# Patient Record
Sex: MALE
[De-identification: names, ages, dates, MRNs, and addresses within clinical notes are randomized per-mention and may not be internally consistent; named-entity substitution may affect disease eponyms.]

---

## 2020-06-29 ENCOUNTER — HOSPITAL ENCOUNTER (INPATIENT)
Dept: HOSPITAL 92 - ERS | Age: 60
LOS: 2 days | Discharge: HOME | DRG: 565 | End: 2020-07-01
Attending: INTERNAL MEDICINE | Admitting: INTERNAL MEDICINE
Payer: OTHER GOVERNMENT

## 2020-06-29 VITALS — BODY MASS INDEX: 27.9 KG/M2

## 2020-06-29 DIAGNOSIS — Z89.511: ICD-10-CM

## 2020-06-29 DIAGNOSIS — E87.1: ICD-10-CM

## 2020-06-29 DIAGNOSIS — T87.43: Primary | ICD-10-CM

## 2020-06-29 DIAGNOSIS — L23.89: ICD-10-CM

## 2020-06-29 DIAGNOSIS — F10.20: ICD-10-CM
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
## 2020-06-29 DIAGNOSIS — D75.89: ICD-10-CM

## 2020-06-29 DIAGNOSIS — Z87.891: ICD-10-CM

## 2020-06-29 DIAGNOSIS — E03.9: ICD-10-CM

## 2020-06-29 DIAGNOSIS — E87.2: ICD-10-CM

## 2020-06-29 DIAGNOSIS — Z79.899: ICD-10-CM

## 2020-06-29 LAB
ALBUMIN SERPL BCG-MCNC: 3.5 G/DL (ref 3.5–5)
ALP SERPL-CCNC: 237 U/L (ref 40–110)
ALT SERPL W P-5'-P-CCNC: 60 U/L (ref 8–55)
ANION GAP SERPL CALC-SCNC: 16 MMOL/L (ref 10–20)
AST SERPL-CCNC: 75 U/L (ref 5–34)
BASOPHILS # BLD AUTO: 0.1 THOU/UL (ref 0–0.2)
BASOPHILS NFR BLD AUTO: 0.8 % (ref 0–1)
BILIRUB SERPL-MCNC: 0.7 MG/DL (ref 0.2–1.2)
BUN SERPL-MCNC: 5 MG/DL (ref 8.4–25.7)
CALCIUM SERPL-MCNC: 8.9 MG/DL (ref 7.8–10.44)
CHLORIDE SERPL-SCNC: 96 MMOL/L (ref 98–107)
CO2 SERPL-SCNC: 19 MMOL/L (ref 22–29)
CREAT CL PREDICTED SERPL C-G-VRATE: 0 ML/MIN (ref 70–130)
EOSINOPHIL # BLD AUTO: 0.1 THOU/UL (ref 0–0.7)
EOSINOPHIL NFR BLD AUTO: 1.4 % (ref 0–10)
GLOBULIN SER CALC-MCNC: 4.4 G/DL (ref 2.4–3.5)
GLUCOSE SERPL-MCNC: 111 MG/DL (ref 70–105)
HGB BLD-MCNC: 14.2 G/DL (ref 14–18)
LYMPHOCYTES # BLD: 1.7 THOU/UL (ref 1.2–3.4)
LYMPHOCYTES NFR BLD AUTO: 25.4 % (ref 21–51)
MCH RBC QN AUTO: 35.6 PG (ref 27–31)
MCV RBC AUTO: 103 FL (ref 78–98)
MDIFF COMPLETE?: YES
MONOCYTES # BLD AUTO: 0.8 THOU/UL (ref 0.11–0.59)
MONOCYTES NFR BLD AUTO: 12.3 % (ref 0–10)
NEUTROPHILS # BLD AUTO: 4.1 THOU/UL (ref 1.4–6.5)
NEUTROPHILS NFR BLD AUTO: 60.1 % (ref 42–75)
PLATELET # BLD AUTO: 109 THOU/UL (ref 130–400)
POTASSIUM SERPL-SCNC: 3.7 MMOL/L (ref 3.5–5.1)
RBC # BLD AUTO: 3.98 MILL/UL (ref 4.7–6.1)
SODIUM SERPL-SCNC: 127 MMOL/L (ref 136–145)
WBC # BLD AUTO: 6.9 THOU/UL (ref 4.8–10.8)

## 2020-06-29 PROCEDURE — 87070 CULTURE OTHR SPECIMN AEROBIC: CPT

## 2020-06-29 PROCEDURE — 96366 THER/PROPH/DIAG IV INF ADDON: CPT

## 2020-06-29 PROCEDURE — 87205 SMEAR GRAM STAIN: CPT

## 2020-06-29 PROCEDURE — 84443 ASSAY THYROID STIM HORMONE: CPT

## 2020-06-29 PROCEDURE — 80053 COMPREHEN METABOLIC PANEL: CPT

## 2020-06-29 PROCEDURE — 96365 THER/PROPH/DIAG IV INF INIT: CPT

## 2020-06-29 PROCEDURE — 80202 ASSAY OF VANCOMYCIN: CPT

## 2020-06-29 PROCEDURE — 96367 TX/PROPH/DG ADDL SEQ IV INF: CPT

## 2020-06-29 PROCEDURE — 87040 BLOOD CULTURE FOR BACTERIA: CPT

## 2020-06-29 PROCEDURE — 36415 COLL VENOUS BLD VENIPUNCTURE: CPT

## 2020-06-29 PROCEDURE — 85025 COMPLETE CBC W/AUTO DIFF WBC: CPT

## 2020-06-29 PROCEDURE — 83605 ASSAY OF LACTIC ACID: CPT

## 2020-06-29 NOTE — CT
CT Lower Ext Rt W Con



History: Erythema, ecchymosis of right distal thigh



Comparison: None.



Findings: The exam does not go through the right below the knee amputation stump. Possible inflow art
ifact versus less likely thrombosis of the right femoral and popliteal veins.



Mild superficial soft tissue swelling along the thigh. No abnormal fascial swelling. No evidence for 
myositis. Musculature below the knee is atrophied.



Mild demineralization. No acute fracture.



Approximately 50% narrowing of the right popliteal artery at the level of the knee for length of 4 mm
. No definite arterial occlusion.



Small reactive right superficial inguinal lymph nodes.



Impression: 

1. Mild right lower extremity superficial soft tissue swelling can be seen with cellulitis.

2. Likely inflow artifact and less likely venous thrombosis of the right distal femoral and popliteal
 vein. Ultrasound recommended if clinically warranted. This also could be a source of lower

extremity edema if this is indeed a thrombus.



Reported By: Bernardo Peres 

Electronically Signed:  6/29/2020 7:21 PM

## 2020-06-30 NOTE — PDOC.HHP
Hospitalist HPI





- History of Present Illness


leg swelling


History of Present Illness: 


Case of an 59y/o male with pmhx of alcoholism and bka due to an infection who 

comes to hospital due to swelling and erythema of stump. patient refers that he 

was on his usual state of health until 3-4 days ago when he noted some blister 

after changing his stump sock. he states those blister kept progressing and now 

cover all of his thigh. he refers erythema swelling and tenderness to 

palpation. patient denies any fever chills or general malaise, he does report 

some purulent secretions





Hospitalist ROS





- Review of Systems


All other systems reviewed; all pertinent +/- noted in HPI/Subj





- Medication


Medications: 


Active Medications











Generic Name Dose Route Start Last Admin





  Trade Name Freq  PRN Reason Stop Dose Admin


 


Piperacillin Sod/Tazobactam  100 mls @ 200 mls/hr  06/29/20 23:59  06/30/20 00:

56





  Sod 3.375 gm/ Sodium Chloride  IVPB   100 mls





  Q6HR TAMARA   Administration





     





     





     





     














Hospitalist History





- Past Medical History


Source: patient





- Past Surgical History


Other Surgical History: 





bka





- Family History


Family History: reports: cancer, cardiac disorder





- Social History


Smoking Status: Former smoker


Alcohol: reports: Heavy


Drugs: reports: none


Activity level: independent ambulation





- Exam


General Appearance: NAD, awake alert


Eye: PERRL, anicteric sclera


ENT: normocephalic atraumatic, no oropharyngeal lesions


Neck: supple, symmetric, no JVD, no thyromegaly


Heart: RRR, no murmur, no gallops


Respiratory: CTAB, no wheezes, no rales, no ronchi


Gastrointestinal: soft, non-tender, non-distended, normal bowel sounds


Extremities: no cyanosis, no clubbing, no edema


Extremities - other findings: erythema swelling purulent secreiont


Neurological: cranial nerve grossly intact, normal sensation to touch, no 

weakness


Musculoskeletal: normal tone, normal strength, no muscle wasting


Psychiatric: normal affect, normal behavior, A&O x 3





Hospitalist Results





- Labs


Result Diagrams: 


 06/29/20 18:03





 06/29/20 18:03


Lab results: 


 











WBC  6.9 thou/uL (4.8-10.8)   06/29/20  18:03    


 


Hgb  14.2 g/dL (14.0-18.0)   06/29/20  18:03    


 


Hct  40.9 % (42.0-52.0)  L  06/29/20  18:03    


 


MCV  103.0 fL (78.0-98.0)  H  06/29/20  18:03    


 


Plt Count  109 thou/uL (130-400)  L  06/29/20  18:03    


 


Neutrophils %  60.1 % (42.0-75.0)   06/29/20  18:03    


 


Sodium  127 mmol/L (136-145)  L  06/29/20  18:03    


 


Potassium  3.7 mmol/L (3.5-5.1)   06/29/20  18:03    


 


Chloride  96 mmol/L ()  L  06/29/20  18:03    


 


Carbon Dioxide  19 mmol/L (22-29)  L  06/29/20  18:03    


 


BUN  5 mg/dL (8.4-25.7)  L  06/29/20  18:03    


 


Creatinine  0.89 mg/dL (0.7-1.3)   06/29/20  18:03    


 


Glucose  111 mg/dL ()  H  06/29/20  18:03    


 


Lactic Acid  2.6 mmol/L (0.5-2.2)  H  06/29/20  21:01    


 


Calcium  8.9 mg/dL (7.8-10.44)   06/29/20  18:03    


 


Total Bilirubin  0.7 mg/dL (0.2-1.2)   06/29/20  18:03    


 


AST  75 U/L (5-34)  H  06/29/20  18:03    


 


ALT  60 U/L (8-55)  H  06/29/20  18:03    


 


Alkaline Phosphatase  237 U/L ()  H  06/29/20  18:03    


 


Serum Total Protein  7.9 g/dL (6.0-8.3)   06/29/20  18:03    


 


Albumin  3.5 g/dL (3.5-5.0)   06/29/20  18:03    














- EKG Interpretation


EKG: 


leg ct - soft tissues swelling





Hospitalist H&P A/P





- Problem


(1) Cellulitis


Code(s): L03.90 - CELLULITIS, UNSPECIFIED   Status: Acute   





(2) Alcoholism


Code(s): F10.20 - ALCOHOL DEPENDENCE, UNCOMPLICATED   Status: Acute   





(3) Hyponatremia


Code(s): E87.1 - HYPO-OSMOLALITY AND HYPONATREMIA   Status: Acute   





- Plan


Plan: 


59 y/o male w the states pmhx who comes to hospital due to cellulits of his leg 

with elevated lactid acid





- zosyn + vanc


- f/u blood cultures


- ivfs, f/u bmps


- f/u lactid acid


- f/u us venous r/o dvt


- thiamine / folic acid


- ativan prn for withdrawal

## 2020-06-30 NOTE — CON
DATE OF CONSULTATION:  06/30/2020



REASON FOR CONSULTATION:  Possible cellulitis versus dermatitis.



HISTORY OF PRESENT ILLNESS:  A 60-year-old who has a history of alcoholism and

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 
smoking

in the past, [place] himself and 
developed a

burning sensation in the right thigh about 2 or 3 days before admission.  This 
was

associated with a new sock for his prostatic device.  He ended up having to 
come to

the hospital, because of persistence of the abnormality with pain.  He did not 
have

any fever, chills.  No vomiting.  No cough.  No respiratory symptoms.  No 
diarrhea.

No genitourinary symptoms. 



PAST MEDICAL HISTORY:  Necrotizing infection, right foot, which led to a right 
BKA,

alcoholism. 



FAMILY HISTORY:  Coronary artery disease.



SOCIAL HISTORY:  Lives by himself in a trailer park, is disabled.  Former 
smoker.

Drinks heavily daily.  No other drug use. 



CURRENT MEDICATIONS:  

1. Norco.

2. Lovenox.

3. Folvite.

4. Ativan.

5. Zofran.

6. Zosyn.

7. Thiamine.

8. Vancomycin.



PHYSICAL EXAMINATION:

VITAL SIGNS:  Temperature max 99.1, /72, pulse 98, respirations 16, and O2

saturation 95. 

SKIN:  Shows the left leg with hyperkeratotic scabs in the legs in a 
circumferential

distribution.  The thigh has about 8 to 9 cm white band wrapping around the mid

right thigh with areas of blistering, purpura and erythema.  When I went to see 
the

patient, the blistering had kind of subsided a bit.  Most of it is 
characterized by

violaceous plaques with kind of a dried appearance.  Now it is more of a 
pruritic

sensation than pain or burning sensation that had been present before.  The end 
of

the stump itself appears okay without any changes of significance.  He has no

lymphadenopathy.  He is disheveled. 

HEENT:  Ocular movements conjugate.  Conjunctivae normal.  Oral cavity with 
quite a

few decaying teeth in place. 

NECK:  Supple, jugular vein distention. 

LUNGS:  Symmetric clear breath sounds. 

HEART:  S1, S2 regular rate.  No S3 or S4. 

ABDOMEN:  Soft, not distended or nontender.  No ascites.  No bladder 
distention.   

GENITOURINARY:  No genital abnormalities. 

PULSES:  1+ in popliteals.   

I could not feel dorsalis pedis on the left side.   

NEUROLOGIC:  He is awake, kind of somewhat cantankerous, not very willing to be

examined or interviewed. 



LABORATORY DATA:  His white cell count is 6.9, hemoglobin 14, platelets 109, 60%

neutrophils, 25% lymphocytes.  He had a sodium of 127, creatinine 0.89, AST 75, 
ALT

60, alkaline phosphatase 237, albumin 3.5, globulin 4.4, lactic acid was 2.4 
down to

1.5.  Microbiology has 2 sets of negative blood cultures and there was a 
drainage

wound with a young culture.  There is a moderate gram-positive cocci in pairs 
and

clusters, moderate gram-positive rods, looks like skin allyssa type of recovery.

There is a vascular ultrasound, which showed no DVT in the right lower 
extremity and

there is a lower extremity CT scan which showed mild right lower extremity

superficial soft tissue swelling and nothing else. 



ASSESSMENT:  

1. Prior below-the-knee amputation related to an infection in the right foot in 
the

past, alcoholism with possible associated malnutrition, multivitamin deficiency,

particularly thiamine needs to be considered and replaced. 

2. Bandlike area of erythema with blistering, now with pruritic sensation.  The 
area

is sharply marginated along the material that had been placed, which is the sock

used for the patient to fit the prostatic device in his right lower extremity 
stump. 



DISCUSSION:  The differential diagnosis includes a proshesis sock associated

dermatitis, which is the more likely scenario versus cellulitis.  The sharpness 
of

demarcation along the span of the sock is very suggestive of a form of contact

dermatitis rather than cellulitis and the speed with which this has improved 
also

supports this assessment.  It is unlikely that bacterial pathogens would limit 
their

spread to a very sharp demarcated line along the previously placed material, so 
I

would at this point, recommend discontinuation of antimicrobial therapy, at the 
most

maybe oral Keflex or something of that nature for discharge planning for short

period of time.  He certainly will have to change the material of the sock that 
he

uses to apply the prosthesis  to a different one to prevent recurrence,









Job ID:  [id number]



[facility]

## 2020-06-30 NOTE — ULT
RIGHT LOWER EXTREMITY VENOUS ULTRASOUND:

 

HISTORY: 

Right lower extremity redness and pain.  History of amputation.

 

TECHNIQUE: 

Multiplanar, gray scale, and color Doppler images were obtained in a right lower extremity venous ult
rasound.  Spectral analysis of the Doppler waveforms.

 

FINDINGS: 

The right common femoral vein, profunda femoral vein, superficial femoral vein, and popliteal vein ar
e normal in appearance without visible thrombus.  These vessels demonstrate normal compression, flow,
 and augmentation.  The greater saphenous vein is patent.  A mildly prominent lymph node is seen in t
he right inguinal region measuring 1.9 cm in size.

 

IMPRESSION: 

No evidence of deep vein thrombosis.

 

POS: MICHELLEA

## 2020-06-30 NOTE — PDOC.EVN
Event Note





- Event Note


Event Note: 





Patient is doing okay.  Records reviewed.  Patient was admitted the last night.

  He developed some redness around his right thigh yesterday.  Patient has a 

below the knee amputation has had prior infections in the lower extremity.  He 

does wear a prosthesis.  He denies any new material associated with the 

prosthesis or the sleeve.  His exam reveals a very well circumscribed band of 

erythema around the entire thigh about 5 inches in width.  There appears to be 

some dermal lysis superficially.  There are small satellite erythematous 

macules extending distally from this area.  There is some sloughing of 

superficial skin both at the proximal and distal borders of the band.  His 

white count is normal.  He has been afebrile.  Concerned that this is actually 

more of a contact dermatitis than a true infection.  Patient does not believe 

that to be the case at all.  He is currently on vancomycin and Zosyn.  He 

reports that he [facility] when [facility] had an infected 

right lower extremity.  He reported it gave him diarrhea for the entire time he 

was on it.  He is reporting some of that again now.  I will asked Dr. Holt to 

assess the patient as well.  Patient also has a significant history of alcohol 

abuse.  He has associated hyponatremia which is likely beer Poto oumar.  He is 

on B vitamins.  We will recheck labs.

## 2020-07-01 VITALS — DIASTOLIC BLOOD PRESSURE: 75 MMHG | TEMPERATURE: 98.6 F | SYSTOLIC BLOOD PRESSURE: 118 MMHG

## 2020-07-01 LAB
ALBUMIN SERPL BCG-MCNC: 3.2 G/DL (ref 3.5–5)
ALP SERPL-CCNC: 204 U/L (ref 40–110)
ALT SERPL W P-5'-P-CCNC: 58 U/L (ref 8–55)
ANION GAP SERPL CALC-SCNC: 13 MMOL/L (ref 10–20)
AST SERPL-CCNC: 76 U/L (ref 5–34)
BASOPHILS # BLD AUTO: 0 THOU/UL (ref 0–0.2)
BASOPHILS NFR BLD AUTO: 0.3 % (ref 0–1)
BILIRUB SERPL-MCNC: 1.1 MG/DL (ref 0.2–1.2)
BUN SERPL-MCNC: 7 MG/DL (ref 8.4–25.7)
CALCIUM SERPL-MCNC: 8.3 MG/DL (ref 7.8–10.44)
CHLORIDE SERPL-SCNC: 103 MMOL/L (ref 98–107)
CO2 SERPL-SCNC: 21 MMOL/L (ref 22–29)
CREAT CL PREDICTED SERPL C-G-VRATE: 80 ML/MIN (ref 70–130)
EOSINOPHIL # BLD AUTO: 0 THOU/UL (ref 0–0.7)
EOSINOPHIL NFR BLD AUTO: 0.7 % (ref 0–10)
GLOBULIN SER CALC-MCNC: 4.2 G/DL (ref 2.4–3.5)
GLUCOSE SERPL-MCNC: 130 MG/DL (ref 70–105)
HGB BLD-MCNC: 13.9 G/DL (ref 14–18)
LYMPHOCYTES # BLD: 0.9 THOU/UL (ref 1.2–3.4)
LYMPHOCYTES NFR BLD AUTO: 13.9 % (ref 21–51)
MCH RBC QN AUTO: 34.8 PG (ref 27–31)
MCV RBC AUTO: 105 FL (ref 78–98)
MONOCYTES # BLD AUTO: 0.9 THOU/UL (ref 0.11–0.59)
MONOCYTES NFR BLD AUTO: 14.4 % (ref 0–10)
NEUTROPHILS # BLD AUTO: 4.4 THOU/UL (ref 1.4–6.5)
NEUTROPHILS NFR BLD AUTO: 70.7 % (ref 42–75)
PLATELET # BLD AUTO: 95 THOU/UL (ref 130–400)
POTASSIUM SERPL-SCNC: 3.6 MMOL/L (ref 3.5–5.1)
RBC # BLD AUTO: 3.99 MILL/UL (ref 4.7–6.1)
SODIUM SERPL-SCNC: 133 MMOL/L (ref 136–145)
VANCOMYCIN TROUGH SERPL-MCNC: 31.3 UG/ML
WBC # BLD AUTO: 6.3 THOU/UL (ref 4.8–10.8)

## 2020-07-02 NOTE — DIS
DATE OF ADMISSION:  06/29/2020



DATE OF DISCHARGE:  07/01/2020



DISCHARGE DIAGNOSES:  

1. Cellulitis.

2. Contact dermatitis.

3. Chronic alcohol abuse.

4. Hyponatremia.

5. Transaminitis.

6. Hypothyroidism.

7. Mild lactic acidosis, resolved.

8. Macrocytosis.



HISTORY OF PRESENT ILLNESS:  The patient is a 60-year-old male with history of

chronic alcohol use, who has a right below-the-knee amputation.  The patient has a

prosthesis and wears a sleeve over the initial stocking.  The patient had presented

to the emergency department with a large area of erythema around the right thigh

concerning for infection.  He had mild hyponatremia felt to be related primarily to

beer potomania.  He was started on broad-spectrum antibiotics. 



HOSPITAL COURSE:  The following day I was pretty clear that the patient's erythema

around the leg was very linear and in a very specific well-demarcated band around

the thigh encompassing the same area, where the sleeve from the prosthesis would be.

 It was felt to be more likely contact dermatitis than purely cellulitic infection.

ID was consulted to confirm that and felt similarly.  The following morning, the leg

look substantially better.  It was felt that this was not warranting further

significant antibiotics.  The patient's kidney function, however, appeared to be

slightly worse on the day of discharge with GFR coming from 87 down to 51.  It was

unclear if this might have been related to the antibiotics.  I had a long

conversation with the patient, recommending that we continue some IV fluids and

recheck his labs the following day.  However, he was pretty adamant that he wanted

to discharge.  [facility], but felt like it might be

a while.  I encouraged him to have home health come out and recheck his labs, so

that I could follow up on that.  He did not want to do that either.  He simply

wanted to get out of the hospital.  He was educated on the fact his alcohol was

having on his sodium levels and blood counts and encouraged to discontinue alcohol

consumption. 



PHYSICAL EXAMINATION:

VITAL SIGNS:  On the day of discharge, temperature was 98.6, pulse 89, respirations

18, O2 saturation 96% on room air, /75. 

GENERAL:  He is awake and alert. 

HEART:  Regular. 

LUNGS:  Clear. 

ABDOMEN:  Benign. 

EXTREMITIES:  Right lower extremity had some mild superficial dermolysis with

dramatic improvement of the generalized erythema. 



DISPOSITION:  The patient is discharged to home.  He is to have a regular diet.  His

activity is as tolerated.  He is encouraged to change out the sleeve on his

prosthesis and he says he does have another one and will be able to do that.  He is

to [facility], where he is to have repeat labs to check his renal

function and his thyroid function.  He understands that this should be done at the

earliest possible opportunity and again, the patient declined any home health.

Activity as tolerated.  Diet regular.  Medications, Tylenol p.r.n.  He is to follow

up at the [facility] in 7 days. 







Job ID:  [id number]

## 2020-07-03 NOTE — PQF
[employer] Win[employer]

KRAIG COOK DAVID R MD

[id number]                                                             SURG B-
[id number]

[id number]                             

                                   

CLINICAL DOCUMENTATION CLARIFICATION FORM:  POST DISCHARGE



Addendum to original discharge summary date:  __________________________________
____



Late entry note date:  _________________________________________________________
__



DATE: 7/3/20                                               ATTN: Garret Gallegos





Please exercise your independent, professional judgment in responding to the 
clarification form. 

Clinical indicators are provided on the bottom of this form for your review



Please check appropriate box(s):

Conflicting documentation was noted in the Medical Record, please clarify if 
patient is being treated/monitored for:

[  ] Stump Erythema due to Dermatitis and Cellulitis

[ x ] Stump Erythema due to Dermatitis WITHOUT Cellulitis

[  ] Other diagnosis please specify___________

[  ] Unable to determine





For continuity of documentation, please document condition throughout progress 
notes and discharge summary.  Thank You.



CLINICAL INDICATORS - SIGNS / SYMPTOMS/ LABS 

ED Provider pg.1- right lower erythema, burning, for about 4-5 days

H and P pg.1- Swelling and erythemas of stump

H and P pg.3- elevated lactic acid

Event notes- the patient has a below the knee amputation has had prior 
infection in the lower extremity

Event notes- His exam reveals a very well circumscribed band of erythema 
around the entire thight about 5 inches in width

DS pg.1- it was felt to be more contact dermatitis piotr purely cellulitic 
infection





RISK FACTORS

Right below the knee amputation- ED Provider pg.1

Right lower extremity cellulitis

Alcoholism- H and P pg.1

contact dermatitis- DS pg.1





TREATMENT

Lower extremity CT 6/29

Vascular ultrasound 630

Infectious Consult- Dr. Holt

Piperacillin 3.375gm IV- MAR

IV Fluids- MAR







[employer] Win[employer] Viewer

(This form is maintained as a part of the permanent medical record)

2015 [employer].  All Rights Reserved

[email or web address]    [phone]

                                                              

 

JIM